# Patient Record
Sex: MALE | Race: WHITE | NOT HISPANIC OR LATINO | Employment: FULL TIME | URBAN - METROPOLITAN AREA
[De-identification: names, ages, dates, MRNs, and addresses within clinical notes are randomized per-mention and may not be internally consistent; named-entity substitution may affect disease eponyms.]

---

## 2020-08-25 ENCOUNTER — OFFICE VISIT (OUTPATIENT)
Dept: NEUROSURGERY | Facility: CLINIC | Age: 45
End: 2020-08-25

## 2020-08-25 VITALS — HEIGHT: 69 IN | BODY MASS INDEX: 27.02 KG/M2 | TEMPERATURE: 99 F | WEIGHT: 182.4 LBS

## 2020-08-25 DIAGNOSIS — M79.606 PAIN OF LOWER EXTREMITY, UNSPECIFIED LATERALITY: ICD-10-CM

## 2020-08-25 DIAGNOSIS — M51.36 DEGENERATIVE DISC DISEASE, LUMBAR: Primary | ICD-10-CM

## 2020-08-25 PROCEDURE — 99203 OFFICE O/P NEW LOW 30 MIN: CPT | Performed by: NEUROLOGICAL SURGERY

## 2020-08-25 RX ORDER — MULTIVIT-MIN/IRON/FOLIC ACID/K 18-600-40
CAPSULE ORAL
COMMUNITY

## 2020-08-25 RX ORDER — TRAZODONE HYDROCHLORIDE 50 MG/1
50 TABLET ORAL NIGHTLY
COMMUNITY

## 2020-08-25 RX ORDER — MESALAMINE 500 MG/1
CAPSULE, EXTENDED RELEASE ORAL
COMMUNITY

## 2020-08-25 RX ORDER — PAROXETINE HYDROCHLORIDE 20 MG/1
TABLET, FILM COATED ORAL
COMMUNITY

## 2020-08-25 RX ORDER — BUDESONIDE 3 MG/1
CAPSULE, COATED PELLETS ORAL
COMMUNITY

## 2020-08-25 RX ORDER — NABUMETONE 750 MG/1
750 TABLET, FILM COATED ORAL 2 TIMES DAILY
Qty: 60 TABLET | Refills: 1 | Status: SHIPPED | OUTPATIENT
Start: 2020-08-25

## 2020-08-25 NOTE — PROGRESS NOTES
"Stuart Henson  1975  [id number]      Chief Complaint   Patient presents with   • Back Pain   • Leg Pain     RLE       HISTORY OF PRESENT ILLNESS: This is a 45-year-old male who has a genetic predisposition for degenerative disc disease presenting with a 10-year or so history of back pain which is gotten progressively worse over the past 2 years.  6 months ago he had numbness in his left foot; sought the service of chiropractic which resulted in alleviation.  Today he is essentially asymptomatic.  His pain is worsened with lifting and bending.  On occasionally he will \"simply\" wake up in the morning with back pain.  Rest is helpful.  The symptoms are not that associated with nerve root or spinal cord compression.  He has no claudication.  He has no bowel or bladder dysfunction.    Past Medical History:   Diagnosis Date   • Cancer (CMS/HCC)    • Low back pain        Past Surgical History:   Procedure Laterality Date   • HERNIA REPAIR     • ORCHIECTOMY     • TENDON REPAIR         Family History   Problem Relation Age of Onset   • Hyperlipidemia Mother    • Hypertension Mother        Social History     Socioeconomic History   • Marital status:      Spouse name: Not on file   • Number of children: Not on file   • Years of education: Not on file   • Highest education level: Not on file   Tobacco Use   • Smoking status: Never Smoker   • Smokeless tobacco: Current User     Types: Chew   Substance and Sexual Activity   • Alcohol use: Yes   • Drug use: Never   • Sexual activity: Defer       No Known Allergies      Current Outpatient Medications:   •  traZODone (DESYREL) 50 MG tablet, Take 50 mg by mouth Every Night., Disp: , Rfl:   •  Ascorbic Acid (VITAMIN C) 500 MG capsule, , Disp: , Rfl:   •  Budesonide (ENTOCORT EC) 3 MG 24 hr capsule, , Disp: , Rfl:   •  Cholecalciferol (VITAMIN D3) 125 MCG (5000 UT) tablet tablet, , Disp: , Rfl:   •  Cyanocobalamin (VITAMIN B-12) 5000 MCG sublingual tablet, , Disp: , Rfl: "   •  Melatonin 5 MG capsule, , Disp: , Rfl:   •  mesalamine (Pentasa) 500 MG CR capsule, , Disp: , Rfl:   •  PARoxetine (Paxil) 20 MG tablet, , Disp: , Rfl:     Review of Systems   Constitutional: Negative for activity change, appetite change, chills, diaphoresis, fatigue, fever and unexpected weight change.   HENT: Negative for congestion, dental problem, drooling, ear discharge, ear pain, facial swelling, hearing loss, mouth sores, nosebleeds, postnasal drip, rhinorrhea, sinus pressure, sneezing, sore throat, tinnitus, trouble swallowing and voice change.    Eyes: Negative for photophobia, pain, discharge, redness, itching and visual disturbance.   Respiratory: Negative for apnea, cough, choking, chest tightness, shortness of breath, wheezing and stridor.    Cardiovascular: Negative for chest pain, palpitations and leg swelling.   Gastrointestinal: Negative for abdominal distention, abdominal pain, anal bleeding, blood in stool, constipation, diarrhea, nausea, rectal pain and vomiting.   Endocrine: Negative for cold intolerance, heat intolerance, polydipsia, polyphagia and polyuria.   Genitourinary: Negative for decreased urine volume, difficulty urinating, dysuria, enuresis, flank pain, frequency, genital sores, hematuria and urgency.   Musculoskeletal: Positive for back pain. Negative for arthralgias, gait problem, joint swelling, myalgias, neck pain and neck stiffness.   Skin: Negative for color change, pallor, rash and wound.   Allergic/Immunologic: Negative for environmental allergies, food allergies and immunocompromised state.   Neurological: Negative for dizziness, tremors, seizures, syncope, facial asymmetry, speech difficulty, weakness, light-headedness, numbness and headaches.   Hematological: Negative for adenopathy. Does not bruise/bleed easily.   Psychiatric/Behavioral: Negative for agitation, behavioral problems, confusion, decreased concentration, dysphoric mood, hallucinations, self-injury, sleep  "disturbance and suicidal ideas. The patient is not nervous/anxious and is not hyperactive.    All other systems reviewed and are negative.      Vitals:    08/25/20 1447   Temp: 99 °F (37.2 °C)   TempSrc: Infrared   Weight: 82.7 kg (182 lb 6.4 oz)   Height: 175.3 cm (69\")       Neurological Examination:    Mental status/speech: The patient is alert and oriented.  Speech is clear without aphysia or dysarthria.  No overt cognitive deficits.    Cranial nerve examination:    Olfaction: Smell is intact.  Vision: Vision is intact without visual field abnormalities.  Funduscopic examination is normal.  No pupillary irregularity.  Ocular motor examination: The extraocular muscles are intact.  There is no diplopia.  The pupil is round and reactive to both light and accommodation.  There is no nystagmus.  Facial movement/sensation: There is no facial weakness.  Sensation is intact in the first, second, and third divisions of the trigeminal nerve.  The corneal reflex is intact.  Auditory: Hearing is intact to finger rub bilaterally.  Cranial nerves IX, X, XI, XII: Phonation is normal.  No dysphagia.  Tongue is protruded in the midline without atrophy.  The gag reflex is intact.  Shoulder shrug is normal.    Musculoligamentous ligamentous examination: BMI 26.9; weight 182 pounds.  He has limitation of the range of motion of the lumbar spine.  Straight leg raising, Lasegue's and flip test negative.  I am unable to find any evidence of weakness, sensory loss or reflex asymmetry.  His gait is normal.    Medical Decision Making:     Diagnostic Data Set: Lumbar MRI data set shows Schmorl's node at T12-L1.  He has disc degeneration L3-4, L4-5 and particularly L5-S1.  There is slight foraminal narrowing at L5-S1.      Assessment: Degenerative disc disease          Recommendations: Surgery is not warranted; will not provide a remedy.  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 in construction.  I have suggested that he visit physical therapy for education and home exercise program and how to lift, etc.  We have given him a prescription of Relafen 750 mg twice daily for 1 month to see if this will be efficacious.  There is really very little else to do for him other than reassurance and treat him symptomatically as the symptoms worsened.  Obviously since this is genetic this may well be the case therefore its key that he be careful about lifting bending and twisting.  I discussed this with him in detail.        I greatly appreciate the opportunity to see and evaluate this individual.  If you have questions or concerns regarding issues that I may have overlooked please call me at any time: [phone].  Levon Mack M.D.  [facility]  [address] [place] [address]    Scribed for Cam Mack MD by Xavier Peralta CMA. 8/25/2020 14:51

## 2020-08-25 NOTE — PATIENT INSTRUCTIONS
Call Dr. Mack on a Monday or Tuesday (ask for Ronna or Deann) and leave a message.     Dr. Mack will call you back at the end of the day as soon as he can.     [phone] Ronna    [phone] Deann Darnell